# Patient Record
Sex: MALE | URBAN - METROPOLITAN AREA
[De-identification: names, ages, dates, MRNs, and addresses within clinical notes are randomized per-mention and may not be internally consistent; named-entity substitution may affect disease eponyms.]

---

## 2024-10-16 ENCOUNTER — EMERGENCY (EMERGENCY)
Facility: HOSPITAL | Age: 39
LOS: 1 days | Discharge: HOME | End: 2024-10-16
Attending: EMERGENCY MEDICINE
Payer: SELF-PAY

## 2024-10-16 VITALS
WEIGHT: 164.91 LBS | HEIGHT: 68 IN | RESPIRATION RATE: 20 BRPM | OXYGEN SATURATION: 98 % | SYSTOLIC BLOOD PRESSURE: 122 MMHG | TEMPERATURE: 97 F | DIASTOLIC BLOOD PRESSURE: 85 MMHG | HEART RATE: 78 BPM

## 2024-10-16 PROCEDURE — 99283 EMERGENCY DEPT VISIT LOW MDM: CPT

## 2024-10-16 PROCEDURE — 99053 MED SERV 10PM-8AM 24 HR FAC: CPT

## 2024-10-16 NOTE — ED ADULT TRIAGE NOTE - CHIEF COMPLAINT QUOTE
PT BIBA found intoxicated on side of road. PT reports drinking today though is unsure of amount. PT breathing spontaneously and denies any complaints, PT changed into yellow gown and belongings secured

## 2024-10-17 VITALS
TEMPERATURE: 98 F | SYSTOLIC BLOOD PRESSURE: 112 MMHG | HEART RATE: 77 BPM | DIASTOLIC BLOOD PRESSURE: 67 MMHG | OXYGEN SATURATION: 98 % | RESPIRATION RATE: 20 BRPM

## 2024-10-17 PROCEDURE — 82962 GLUCOSE BLOOD TEST: CPT

## 2024-10-17 PROCEDURE — 99285 EMERGENCY DEPT VISIT HI MDM: CPT

## 2024-10-17 NOTE — ED PROVIDER NOTE - ATTENDING CONTRIBUTION TO CARE
I performed a history and physical exam of the patient and discussed their management with the resident. I reviewed the resident's note and agree with the documented findings and plan of care. My medical decision making and observations are found below.     40yo male with presenting with alcohol intoxication. No other complaints. Monitored until clinically sober, now stable for dc.

## 2024-10-17 NOTE — ED ADULT NURSE NOTE - BIRTH SEX
Anesthesia Post Evaluation    Patient: Lora Goins    Procedure(s) Performed: Procedure(s) (LRB):  ARTHROPLASTY, KNEE, TOTAL-[employer] NELSY (Left)    Final Anesthesia Type: spinal  Patient location during evaluation: PACU  Patient participation: Yes- Able to Participate  Level of consciousness: awake and alert  Post-procedure vital signs: reviewed and stable  Pain management: adequate  Airway patency: patent  PONV status at discharge: No PONV  Anesthetic complications: no      Cardiovascular status: blood pressure returned to baseline  Respiratory status: unassisted  Hydration status: euvolemic  Follow-up not needed.          Vitals Value Taken Time   /81 8/29/2019 10:01 AM   Temp 36.5 °C (97.7 °F) 8/29/2019  9:00 AM   Pulse 72 8/29/2019 10:23 AM   Resp 19 8/29/2019 10:23 AM   SpO2 94 % 8/29/2019 10:23 AM   Vitals shown include unvalidated device data.      Event Time     Out of Recovery 09:30:00          Pain/Micaela Score: Pain Rating Prior to Med Admin: 7 (8/29/2019  9:29 AM)  Pain Rating Post Med Admin: 0 (8/29/2019  6:55 AM)  Micaela Score: 9 (8/29/2019  9:45 AM)         Male

## 2024-10-17 NOTE — ED PROVIDER NOTE - PATIENT PORTAL LINK FT
You can access the FollowMyHealth Patient Portal offered by [facility] by registering at the following website: [email or web address]. By joining [facility]’s FollowMyHealth portal, you will also be able to view your health information using other applications (apps) compatible with our system.

## 2024-10-17 NOTE — ED PROVIDER NOTE - CLINICAL SUMMARY MEDICAL DECISION MAKING FREE TEXT BOX
Patient was seen and evaluated in the ED BIBEMS after being on the side of the road intoxicated. Pt endorsed drinking 1 pint of liquor earlier tonight and smoking 3 blunts of marijuana. Physical exam was unremarkable. Patient was seen and evaluated in the ED BIBEMS after being on the side of the road intoxicated. Pt endorsed drinking 1 pint of liquor earlier tonight and smoking 3 blunts of marijuana. Physical exam was unremarkable.    Progress Note: Patient feeling better this morning. On exam, in no acute distress, hemodynamically stable. Results of POC glucose and plan were shared with patient. Patient in agreement to be discharged and go home.

## 2024-10-17 NOTE — ED PROVIDER NOTE - OBJECTIVE STATEMENT
Pt is a 40 yo M w/ unknown PMH BIBA after being found on the side of the road for intoxication. Pt states that earlier tonight he drank a pint of liquor and smoked three blunts of marijuana. Denies any other recreational drug use. Pt denies any head trauma, falls. Pt denies any fever, chills, diaphoresis, syncope symptoms, palpitations, chest pain, sob, abd pain, n/v/d, flank pain, issues w/ BM/urination, numbness/tingling/weakness in the extremities, tremors, audio/visual hallucinations.

## 2024-10-17 NOTE — ED PROVIDER NOTE - NSFOLLOWUPINSTRUCTIONS_ED_ALL_ED_FT
Alcohol Intoxication  Alcohol intoxication occurs when a person no longer thinks clearly or functions well after drinking alcohol. This is also referred to as becoming impaired. Intoxication can occur with just one drink. The legal definition of alcohol intoxication depends on the amount of alcohol in the blood (blood alcohol concentration, CIRO). CIRO of 80–100 mg/dL or higher is commonly considered legally intoxicated. The level of impairment depends on:  The amount of alcohol the person had.  The person's age, gender, and weight.  How often the person drinks.  Whether the person has other medical conditions, such as diabetes, seizures, or a heart condition.  Alcohol intoxication can range from mild to severe. The condition can be dangerous, especially if the person:  Also took certain drugs or prescription medicines.  Drinks a large amount of alcohol in a short period of time (binge drinks).  For women, binge drinking is having four or more drinks at one time.  For men, binge drinking is having five or more drinks at one time.  If you or anyone around you appears intoxicated, speak up and act.    What are the causes?  This condition is caused by drinking alcohol.    What increases the risk?  The following factors may make you more likely to develop this condition:  Peer pressure in young adults.  Difficulty managing stress.  History of drug or alcohol abuse.  Family history of drug or alcohol abuse.  Combining alcohol with drugs.  Low body weight.  Binge drinking.  What are the signs or symptoms?  Symptoms of alcohol intoxication can vary from person to person. Symptoms can be mild, moderate, or severe.    Symptoms of mild alcohol intoxication may include:  Feeling relaxed or sleepy.  Having mild difficulty with coordination, speech, memory, or attention.  Symptoms of moderate alcohol intoxication may include:  Strong anger or extreme sadness.  Moderate difficulty with coordination, speech, memory, or attention.  Symptoms of severe alcohol intoxication may include:  Severe difficulty with coordination, speech, memory, or attention.  Passing out.  Vomiting.  Confusion.  Slow breathing.  Coma.  Intoxication can change quickly from mild to severe. It can cause coma or death, especially in people who do not drink alcohol often.    How is this diagnosed?  Your health care provider will ask you how much alcohol you drank and what kind you had. Intoxication may also be diagnosed based on:  Your symptoms and medical history.  A physical exam.  A blood test that measures CIRO.  A smell of alcohol on your breath.  How is this treated?  Treatment for alcohol intoxication may include:  Being monitored in an emergency department, hospital, or treatment center until your CIRO comes down and it is safe for you to go home.  IV fluids to prevent or treat loss of fluid in the body (dehydration).  Medicine to treat nausea or vomiting or to get rid of alcohol in the body.  Counseling about the dangers of using alcohol.  Treatment for substance use disorder.  Oxygen therapy or a breathing machine (ventilator).  Drinking alcohol for a long time can have long-term effects on your brain, heart, and digestive system. These effects can be serious and may also require treatment.    Follow these instructions at home:  A sign showing that a person should not drive.  Eating and drinking    A 12-ounce bottle of beer, a 5-ounce glass of wine, and a 1.5-ounce shot of hard liquor.  Do not drink alcohol if:  Your health care provider tells you not to drink.  You are pregnant, may be pregnant, or are planning to become pregnant.  You are under the legal drinking age, or under 21 years old in the [place]  You are taking medicines that should not be taken with alcohol.  You have a medical condition, and alcohol makes it worse.  You need to drive or perform activities that require you to be alert.  You have substance use disorder.  Ask your health care provider if alcohol is safe for you. If your health care provider allows you to drink alcohol, limit how much you have to:  0–1 drink a day for women who are not pregnant.  0–2 drinks a day for men.  Know how much alcohol is in your drink. In the [place] one drink equals one 12 oz bottle of beer (355 mL), one 5 oz glass of wine (148 mL), or one 1½ oz glass of hard liquor (44 mL).  Avoid drinking alcohol on an empty stomach.  Alcohol increases urination. It is important to stay hydrated and avoid caffeine.  Avoid drinking more than one drink per hour.  When having multiple drinks, drink water or a non-alcoholic beverage between alcoholic drinks.  General instructions    Take over-the-counter and prescription medicines only as told by your health care provider.  Do not drive after drinking any amount of alcohol. Plan for a designated  or another way to go home.  Have someone responsible stay with you while you are intoxicated. You should notbe left alone.  Contact a health care provider if:  You do not feel better after a few days.  You have problems at work, at school, or at home due to drinking.  Get help right away if:  You have any of the following:  Trouble staying awake.  Moderate to severe trouble with coordination, speech, memory, or attention.  You are told you may have had a seizure.  Vomiting bright red blood or material that looks like coffee grounds.  Bloody stool (feces). The blood may make your stool bright red, black, or tarry.  These symptoms may be an emergency. Get help right away. Call 911.  Do not wait to see if the symptoms will go away.  Do not drive yourself to the hospital.  Also, get help right away if:  You have thoughts about hurting yourself or others.  Take one of these steps if you feel like you may hurt yourself or others, or have thoughts about taking your own life:  Call 911.  Call the National Suicide Prevention Lifeline at 1-568.720.8288 or 837. This is open 24 hours a day.  Text the Crisis Text Line at 955923.  Summary  Alcohol intoxication occurs when a person no longer thinks clearly or functions well after drinking alcohol.  Ask your health care provider if alcohol is safe for you. If your health care provider allows you to drink alcohol, limit how much you have.  Contact your health care provider if drinking has caused you problems at work, school, or home.  Get help right away if you have thoughts about hurting yourself or others.  This information is not intended to replace advice given to you by your health care provider. Make sure you discuss any questions you have with your health care provider.

## 2024-10-17 NOTE — ED PROVIDER NOTE - PHYSICAL EXAMINATION
Gen: No acute distress, comfortably in bed  HENT: Atraumatic, normocephalic, neck is supple without adenopathy, no JVD  Eyes: PERRLA, conjunctivae are clear, non-icteric sclera  NEURO: A&Ox3, no focal deficits, moving all extremities spontaneously, CN II-XII grossly intact  Resp: CTAB, no wrr, non-labored breathing  Cardio: Regular rate and rhytum, S1/S2 heard, no murmurs, gallops or rubs  Abdominal: Soft, non-tender, non-distended, no appreciable masses, normoactive bowel sounds  : No CVA tenderness  Extremities: Nontender, no clubbing, cyanosis, or edema  Skin: Warm, dry, intact without rashes or lesions